# Patient Record
(demographics unavailable — no encounter records)

---

## 2024-10-18 NOTE — DISCUSSION/SUMMARY
[Routine Follow-up in 3-4 months] : routine follow-up in 3-4 months [FreeTextEntry1] :  Continue daily readings. Track and trend Cardiomems numbers. Medications will be adjusted as per [facility] and provider Follow up appointment scheduled.

## 2024-10-18 NOTE — HISTORY OF PRESENT ILLNESS
[None] : The patient complains of no symptoms [de-identified] : CardioMEMs Summary 9/12/24-10/18/24 DOI:10/12/22  AVG PAD 10-13: mmHg Goal 14 mmHg SUMMARY: During the period indicated above, the patient's pulmonary artery pressures were monitored at least weekly and based on the pressures, the pts medications were adjusted. Medication changes were communicated to the patient.   The daily data reports are in the Cardio MEMS flowsheet and are also archived in the [email or web address] PCN system.

## 2024-11-04 NOTE — HISTORY OF PRESENT ILLNESS
[FreeTextEntry1] : ROSALIND HODGE  is a 75 year old  F  Hx CAD, PAF, Afl s/p RFA, CMP/hfp CM, VHD s/p MVR, VT/VF s/p ICD, hypertension, tob use/COPD, upper GI bleed/PUD, CRI, PVD, hyperlipidemia with statin intol.  Previously presented to [facility] with shortness of breath, dizziness, diaphoresis, elevated heart rate >200, hypotension. Cardioverted for WCT: VT versus atrial fibrillation with rapid ventricular rate/BBB. Underwent cardiac cath and subsequent bivICD Then HF and mitral valve replacement with #29 tissue valve 2019  Recurrent AF, afl s/p MARITZA DCCV 3/20, early recurrence RFA of Afl 6/20 ep rx amio  follows with Dr. Mcdaniels d/t CKD intol sglt2i 2/2 uti/ri  underwent right heart cath with CardioMEMS and carotid angiography underwent peripheral revascularization December 2021  she is now on a lower dose of amiodarone off MRA per her nephrologist there was reported hyperkalemia  No chest pain, pressure, palpitations, unusual shortness of breath, orthopnea or syncope. There are no bleeding issues.    PAD range 10-16mmHg. Goal 14.  When last seen there was increased weight and LE edema. Prior to this had been switched from bumex to lasix due to sx of int dizziness. Lasix then switched back to bumex and LE edema has resolved.   Amio maintenance: LFTs 6/10/24 borderline elevated  TFT 5/4/24: TSH 0.5 wnl PFT 5/2023, pt scheduled to see Dr. Lashae guerra 8/2024 Ophtha scheduled 7/2024  Device:  VT/ATP 1/9/24 was asymptomatic, seen by EP since. No change to medications.  Now S/P gen change 6/4/24 remotely transmitting.   July 2024 hg 13.4, k 4.1, cr 1.6 baseline, LDL 52, trig 166,  AST 64/ALT 75  LDL was 249 off rx On Prava 20mg + Leqvio Now LDL 52  EKG VPACED Echo 6/2024 EF 40s mitral valve no evidence of regurg or stenosis,  mild AS, LVH carotid angio  occluded right innominate artery with no significant left-sided disease Cardiac catheterization 2022 moderate diagonal disease, occluded right coronary artery  She is scheduled for a colonoscopy 8/19/24 (never had before) Seen EP 9/25/24    Preoperative cardiovascular examination, colonoscopy At present, there are no active cardiac conditions.  No recent unstable coronary syndromes, decompensated heart failure, severe valvular heart disease or significant dysrhythmias.   Baseline functional status is acceptable.     The clinical benefit of the proposed procedure outweighs the associated cardiovascular risk.   Risk not attenuated with further CV testing.   Prior testing as outlined above. Optimized from a cardiovascular perspective. DVT ppx Even fluid balance with h/o CHF  Continue beta blocker Transition plavix to aspirin 81mg a week prior and continue asa throughout, may transition back postop For surgery and invasive procedures, recommend to discontinue apixaban at least 48 hours prior to elective surgery or invasive procedures with a moderate-to-high risk of clinically significant bleeding. Anticoagulation bridging during the 48 hours apixaban is interrupted and prior to the intervention is not generally required. Reinitiate apixaban when adequate hemostasis has been achieved.  If oral therapy cannot be administered, then consider administration of a parenteral anticoagulant. Note excess discontinuation of any oral anticoagulant, including apixaban, increases the risk of thrombotic events. Patient was counseled and verbalizes understanding of these associated risks.   CMP, HF, h/o pulm edema VHD s/p MVR CRI Last visit there was fluid overload on lasix, switched back to bumex. Pt has mild orthostasis on bumex but is tolerable and LE edema resolved. PAD is stable. Creat at baseline 1.6 Reviewed echo EF stable 40s. MV well seated. Mild AS. Surveillance monitoring.  Monitor CardioMEMS.   Instructed to notify if persistent edema and weight gain  Cont BB Off spironolactone. SGLT2 intolerance  VT, VF s/p ICD. LBBB. PAF. Afl s/p RFA 6/20.  S/P Gen change see device note for details Cont remote monitoring Stopped amiodarone today seeing as LFTs continue to rise - seeing EP 9/25/24 regarding antiarrhythmic rx Remote monitoring interim Monitor LFTs off rx  CAD, occluded RCA. stable EF No angina Cont med rx antiplatelet therapy and anticoagulation  Reviewed bleeding precautions.  PAD Followup planned vascular RM  Hyperlipidemia ( off statin), statin intol  prava 20mg + Leqvio LDL now 52 cont Leqvio + statin  MV SBE prophylaxis Surveillance monitoring   Discussed red flag symptoms, which would warrant sooner or emergent medical evaluation. Any questions and concerns were addressed and resolved.

## 2024-11-06 NOTE — ADDENDUM
[FreeTextEntry1] : Please note the patient was reviewed with NP Patricia West. I was physically present during the service of the patient. I was directly involved in the management plan and recommendations of the care provided to the patient.  I personally reviewed the history and physical examination as documented by the NP above.

## 2024-11-06 NOTE — ASSESSMENT
[FreeTextEntry1] : ROSALIND HODGE is a 75 year old F who presents today Nov 06, 2024 with the above history and the following active issues:   Elevated LFTs Asx discussed refraining from tylenol/etoh remain off amio  temp stop statin and hold off on today's leqvio until further testing US liver has been ordered and pt will call BEKAH Cole to sched followup to review close followup in 6 weeks ideally repeat labs by then if improve retrial with Leqvio   CMP, HF, h/o pulm edema VHD s/p MVR CRI Cont bumex. PAD is stable. Creat baseline 1.6 has improved. Followup nephro re Ca.  Reviewed echo EF stable 40s. MV well seated. Mild AS. Surveillance monitoring.  Monitor CardioMEMS.   Instructed to notify if persistent edema and weight gain  Cont BB Off spironolactone. SGLT2 intolerance  VT, VF s/p ICD. LBBB. PAF. Afl s/p RFA 6/20.  S/P Gen change see device note for details Cont remote monitoring Stopped amiodarone with LFTs as above. EP followup has been arranged regarding AF burden.  Remote monitoring interim  CAD, occluded RCA. stable EF No angina Cont med rx antiplatelet therapy and anticoagulation  Reviewed bleeding precautions.  PAD (carotid/LE) Followup planned vascular intervention RM  Hyperlipidemia ( off statin), statin intol  prava 20mg + Leqvio LDL now 52 adjusting lipid lowering with LFTs will monitor in followup  MV SBE prophylaxis Surveillance monitoring   Discussed red flag symptoms, which would warrant sooner or emergent medical evaluation. Any questions and concerns were addressed and resolved.   Sincerely,  LISSETTE Smith Patients history, testing, and plan reviewed with supervising MD: Dr. Fredrick Jesus

## 2024-11-06 NOTE — HISTORY OF PRESENT ILLNESS
[FreeTextEntry1] : ROSALIND HODGE  is a 75 year old  F  Hx CAD, PAF, Afl s/p RFA, CMP/hfp CM, VHD s/p MVR, VT/VF s/p ICD, hypertension, tob use/COPD, upper GI bleed/PUD, CRI, PVD, hyperlipidemia with statin intol.  Previously presented to [facility] with shortness of breath, dizziness, diaphoresis, elevated heart rate >200, hypotension. Cardioverted for WCT: VT versus atrial fibrillation with rapid ventricular rate/BBB. Underwent cardiac cath and subsequent bivICD Then HF and mitral valve replacement with #29 tissue valve 2019  Recurrent AF, afl s/p MARITZA DCCV 3/20, early recurrence RFA of Afl 6/20 ep rx amio  follows with Dr. Mcdaniels d/t CKD intol sglt2i 2/2 uti/ri  underwent right heart cath with CardioMEMS and carotid angiography underwent peripheral revascularization December 2021  she is now on a lower dose of amiodarone off MRA per her nephrologist there was reported hyperkalemia  No chest pain, pressure, palpitations, unusual shortness of breath, orthopnea or syncope. There are no bleeding issues.    PAD Goal 14. Has overall been below goal.  Intol lasix due to sx of int dizziness. Lasix then switched back to bumex and LE edema has resolved.  She does stationary bike at home.   LFTs 6/10/24 borderline elevated  TFT 5/4/24: TSH 0.5 wnl PFT 5/2023, pt scheduled to see Dr. Lashae guerra 8/2024 Ophtha scheduled 7/2024  Device:  VT/ATP 1/9/24 was asymptomatic, seen by EP since. No change to medications.  Now S/P gen change 6/4/24 remotely transmitting.   July 2024 hg 13.4, k 4.1, cr 1.6 baseline, LDL 52, trig 166,  AST 64/ALT 75  LDL was 249 off rx On Prava 20mg + Leqvio Now LDL 52  EKG VPACED Echo 6/2024 EF 40s mitral valve no evidence of regurg or stenosis,  mild AS, LVH carotid angio  occluded right innominate artery with no significant left-sided disease, followed annually by vascular Cardiac catheterization 2022 moderate diagonal disease, occluded right coronary artery  Since I last saw her in July has seen multiple specialists. Overall has been doing well. Doing home ex bike no exertional symptoms.  She was taken off amio 3 months ago d/t borderline elevated LFTs. Unfortunately #s have increased since then. She has had colonoscopy was told there is a "kink" in her intestines but plan is to monitor d/t asx and CEA was wnl. She sees Dr. Cole.  PAD stable 11mmHg on most recent transmission. BP stable.  Saw EP there is 4% burden of AF, discussed potential for AF ablation if increased burden. She is remotely monitored.  Saw vascular plan is RLE angio/intervention end of Nov.   Lab 10/31/24 , , k 4.6, cr 1.4, elevated Ca level pt reports known by nephro

## 2024-12-19 NOTE — ASSESSMENT
[FreeTextEntry1] : ROSALIND HODGE is a 75 year old F who presents today Dec 18, 2024 with the above history and the following active issues:   Elevated LFTs Asx does not consume tylenol/etoh off amio (7/2024) off statin (11/2024) and last dose leqvio (5/2024) US liver possible hepatic steatosis LFTs on the rise despite cessation of potential hepatotoxic agents Seeing GI Kelsey this week, will fwd test results for further investigation will cont to refrain from hepatotoxic agents until etiology of LFTs is ascertained  DMII last glucose >300 has modified diet, on oral GLP1a discussed preference for insulin in this setting which she declines keep planned followup PCP for management  CMP, HF, h/o pulm edema VHD s/p MVR CRI Cont bumex. PAD is stable. Creat baseline 1.6 has improved. Followup nephro re Ca.  Reviewed echo EF stable 40s. MV well seated. Mild AS. Surveillance monitoring.  Monitor CardioMEMS.   Instructed to notify if persistent edema and weight gain  Cont BB Off spironolactone. SGLT2 intolerance  VT, VF s/p ICD. LBBB. PAF. Afl s/p RFA 6/20.  S/P Gen change see device note for details Cont remote monitoring Off amio, followup EP regarding AF burden.   CAD, occluded RCA. stable EF No angina Cont med rx antiplatelet therapy and anticoagulation  Reviewed bleeding precautions.  PAD (carotid/LE) Followup planned vascular intervention RM, will defer until glucose/LFTs have improved given LE PAD sx are nonlimiting  Hyperlipidemia ( off statin), statin intol  prava 20mg + Leqvio LDL 52 off lipid lowering d/t LFT concerns consult with prevention team  discussed potential for apheresis which is impractical given pt location  MV SBE prophylaxis Surveillance monitoring   Close clinical followup. Coordinate with multiple specialists.  Discussed red flag symptoms, which would warrant sooner or emergent medical evaluation. Any questions and concerns were addressed and resolved.   Sincerely,   LISSETTE Smith Patient's history, testing, medications, and any relative changes to plan of care reviewed with supervising MD: Dr. Fredrick Jesus

## 2024-12-19 NOTE — HISTORY OF PRESENT ILLNESS
[FreeTextEntry1] : ROSALIND HODGE  is a 75 year old  F  Hx CAD, PAF, Afl s/p RFA, CMP/hfp CM, VHD s/p MVR, VT/VF s/p ICD, hypertension, tob use/COPD, upper GI bleed/PUD, CRI, PVD, hyperlipidemia with statin intol.  Previously presented to [facility] with shortness of breath, dizziness, diaphoresis, elevated heart rate >200, hypotension. Cardioverted for WCT: VT versus atrial fibrillation with rapid ventricular rate/BBB. Underwent cardiac cath and subsequent bivICD Then HF and mitral valve replacement with #29 tissue valve 2019  Recurrent AF, afl s/p MARITZA DCCV 3/20, early recurrence RFA of Afl 6/20 ep rx amio  follows with Dr. Mcdaniels d/t CKD intol sglt2i 2/2 uti/ri  underwent right heart cath with CardioMEMS and carotid angiography underwent peripheral revascularization December 2021  she is now on a lower dose of amiodarone off MRA per her nephrologist there was reported hyperkalemia  No chest pain, pressure, palpitations, unusual shortness of breath, orthopnea or syncope. There are no bleeding issues.    PAD Goal 14. Has overall been below goal.  Intol lasix due to sx of int dizziness. Lasix then switched back to bumex and LE edema has resolved.  She does stationary bike at home.   Device:  VT/ATP 1/9/24 was asymptomatic, seen by EP since. No change to medications.  Now S/P gen change 6/4/24 remotely transmitting.   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 Doing home ex bike no exertional symptoms.  She was taken off amio d/t elevated LFTs. Unfortunately #s have increased since then. She has had colonoscopy was told there is a "kink" in her intestines but plan is to monitor d/t asx and CEA was wnl. She sees Dr. Cole.  PAD stable 12mmHg on most recent transmission. BP stable.  Saw EP there is 5% burden of AF, discussed potential for AF ablation if increased burden. She is remotely monitored.  Saw vascular plan is RLE angio/intervention. She reports her LE discomfort is nonlimiting at this time.   Lab 10/31/24 , , k 4.6, cr 1.4, elevated Ca level pt reports known by nephro elevated glucose 346 Last visit a month ago pravastatin was stopped and held off on Leqvio injection d/t LFTs She has also seen PCP who started her on Rybelsus for DMII. She declined any injectable therapy including insulin She reports improving her diet since dx.  Does not consume tylenol/etoh  Lab 12/2024 , ,

## 2024-12-19 NOTE — HISTORY OF PRESENT ILLNESS
[FreeTextEntry1] : ROSALIND HODGE  is a 75 year old  F  Hx CAD, PAF, Afl s/p RFA, CMP/hfp CM, VHD s/p MVR, VT/VF s/p ICD, hypertension, tob use/COPD, upper GI bleed/PUD, CRI, PVD, hyperlipidemia with statin intol.  Previously presented to [facility] with shortness of breath, dizziness, diaphoresis, elevated heart rate >200, hypotension. Cardioverted for WCT: VT versus atrial fibrillation with rapid ventricular rate/BBB. Underwent cardiac cath and subsequent bivICD Then HF and mitral valve replacement with #29 tissue valve 2019  Recurrent AF, afl s/p MARITZA DCCV 3/20, early recurrence RFA of Afl 6/20 ep rx amio  follows with Dr. Mcdaniels d/t CKD intol sglt2i 2/2 uti/ri  underwent right heart cath with CardioMEMS and carotid angiography underwent peripheral revascularization December 2021  she is now on a lower dose of amiodarone off MRA per her nephrologist there was reported hyperkalemia  No chest pain, pressure, palpitations, unusual shortness of breath, orthopnea or syncope. There are no bleeding issues.    PAD Goal 14. Has overall been below goal.  Intol lasix due to sx of int dizziness. Lasix then switched back to bumex and LE edema has resolved.  She does stationary bike at home.   Device:  VT/ATP 1/9/24 was asymptomatic, seen by EP since. No change to medications.  Now S/P gen change 6/4/24 remotely transmitting.   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 Doing home ex bike no exertional symptoms.  She was taken off amio d/t elevated LFTs. Unfortunately #s have increased since then. She has had colonoscopy was told there is a "kink" in her intestines but plan is to monitor d/t asx and CEA was wnl. She sees Dr. Cole.  PAD stable 12mmHg on most recent transmission. BP stable.  Saw EP there is 5% burden of AF, discussed potential for AF ablation if increased burden. She is remotely monitored.  Saw vascular plan is RLE angio/intervention. She reports her LE discomfort is nonlimiting at this time.   Lab 10/31/24 , , k 4.6, cr 1.4, elevated Ca level pt reports known by nephro elevated glucose 346 Last visit a month ago pravastatin was stopped and held off on Leqvio injection d/t LFTs She has also seen PCP who started her on Rybelsus for DMII. She declined any injectable therapy including insulin She reports improving her diet since dx.  Does not consume tylenol/etoh  Lab 12/2024 , ,

## 2024-12-19 NOTE — ADDENDUM
[facility] Neurology Consultation    Janet Regan MRN# [id number]   Age: 68 year old YOB: 1954     Requesting physician: Bradley Rosa     FreeTextEntry1] : Please note the patient was reviewed with NP Patricia West. I was physically present during the service of the patient. I was directly involved in the management plan and recommendations of the care provided to the patient.  I personally reviewed the history and physical examination as documented by the NP above.  

## 2024-12-19 NOTE — REASON FOR VISIT
[Cardiac Failure] : cardiac failure [Coronary Artery Disease] : coronary artery disease [Arrhythmia/ECG Abnorrmalities] : arrhythmia/ECG abnormalities

## 2024-12-25 NOTE — HISTORY OF PRESENT ILLNESS
[de-identified] : CardioMEMs Summary 11/21/24- 12/24/24 DOI:10/12/22  AVG PAD 10-13: mmHg Goal 14 mmHg SUMMARY: During the period indicated above, the patient's pulmonary artery pressures were monitored at least weekly and based on the pressures, the pts medications were adjusted. Medication changes were communicated to the patient.   The daily data reports are in the Cardio MEMS flowsheet and are also archived in the [email or web address] [facility] system.    [None] : The patient complains of no symptoms

## 2024-12-25 NOTE — HISTORY OF PRESENT ILLNESS
[None] : The patient complains of no symptoms [de-identified] : CardioMEMs Summary 11/21/24- 12/24/24 DOI:10/12/22  AVG PAD 10-13: mmHg Goal 14 mmHg SUMMARY: During the period indicated above, the patient's pulmonary artery pressures were monitored at least weekly and based on the pressures, the pts medications were adjusted. Medication changes were communicated to the patient.   The daily data reports are in the Cardio MEMS flowsheet and are also archived in the [email or web address] [facility] system.

## 2025-01-29 NOTE — HISTORY OF PRESENT ILLNESS
[None] : The patient complains of no symptoms [de-identified] : CardioMEMs Summary Reviewed 12/24/24-1/29/25 DOI:10/12/22  AVG PAD 10-13: mmHg Goal 14 mmHg SUMMARY: During the period indicated above, the patient's pulmonary artery pressures were monitored at least weekly and based on the pressures, the pts medications were adjusted. Medication changes were communicated to the patient.   The daily data reports are in the Cardio MEMS flowsheet and are also archived in the [email or web address] [facility] system.

## 2025-02-12 NOTE — HISTORY OF PRESENT ILLNESS
[FreeTextEntry1] : ROSALIND HODGE  is a 75 year old  F  Hx CAD, PAF, Afl s/p RFA, CMP/hfp CM, VHD s/p MVR, VT/VF s/p ICD, hypertension, tob use/COPD, upper GI bleed/PUD, CRI, PVD, hyperlipidemia with statin intol.  Previously presented to [facility] with shortness of breath, dizziness, diaphoresis, elevated heart rate >200, hypotension. Cardioverted for WCT: VT versus atrial fibrillation with rapid ventricular rate/BBB. Underwent cardiac cath and subsequent bivICD Then HF and mitral valve replacement with #29 tissue valve 2019  Recurrent AF, afl s/p MARITZA DCCV 3/20, early recurrence RFA of Afl 6/20 ep rx amio  follows with Dr. Mcdaniels d/t CKD intol sglt2i 2/2 uti/ri  underwent right heart cath with CardioMEMS and carotid angiography underwent peripheral revascularization December 2021  She does stationary bike at home.  No chest pain, pressure, palpitations, unusual shortness of breath, orthopnea or syncope. There are no bleeding issues.    PAD Goal 14.  off MRA per her nephrologist there was reported hyperkalemia Intol lasix due to sx of int dizziness. Lasix then switched back to bumex and LE edema has resolved.  Bumex dose recently lowered by HF team.   Device:  VT/ATP 1/9/24 was asymptomatic, seen by EP since. No change to medications.  Now S/P gen change 6/4/24 remotely transmitting.   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 Doing home ex bike no exertional symptoms.  She was taken off amio d/t elevated LFTs. Unfortunately #s have increased since then. She has had colonoscopy was told there is a "kink" in her intestines but plan is to monitor d/t asx and CEA was wnl. She sees Dr. Cole.  Saw EP there is 5% burden of AF, discussed potential for AF ablation if increased burden. She is remotely monitored.  Saw vascular plan is RLE angio/intervention. She reports her LE discomfort is nonlimiting at this time.  She has lost weight since last seen. Reports her sugars have improved.  Bumex dosing was reduced.  Infrequent transmissions since change but last was 15 on 2/10/25.  She has noticed an increase in LE edema with reduction of diuretic.  Held off on Leqvio injection d/t LFTs, also statin on hold She has also seen PCP who started her on Rybelsus for DMII. She declined any injectable therapy including insulin She reports improving her diet since dx.  Does not consume tylenol/etoh Last visit with GI reviewed discussed possibly trial off plavix d/t concern for drug induced steatohepatitis.  However as per #s reflected below have improved.  Lab 2/11/25 ALT 58, AST 44 2/3/25 k 4.7, cr 1.28, a1c 9.3, Ca 9.7, glucose 152, microal/creat ratio 233mg/g Lab 12/2024 , ,  Lab 10/31/24 , , k 4.6, cr 1.4, elevated Ca level pt reports known by nephro elevated glucose 346

## 2025-02-12 NOTE — HISTORY OF PRESENT ILLNESS
[FreeTextEntry1] : ROSALIND HODGE  is a 75 year old  F  Hx CAD, PAF, Afl s/p RFA, CMP/hfp CM, VHD s/p MVR, VT/VF s/p ICD, hypertension, tob use/COPD, upper GI bleed/PUD, CRI, PVD, hyperlipidemia with statin intol.  Previously presented to [facility] [place] with shortness of breath, dizziness, diaphoresis, elevated heart rate >200, hypotension. Cardioverted for WCT: VT versus atrial fibrillation with rapid ventricular rate/BBB. Underwent cardiac cath and subsequent bivICD Then HF and mitral valve replacement with #29 tissue valve 2019  Recurrent AF, afl s/p MARITZA DCCV 3/20, early recurrence RFA of Afl 6/20 ep rx amio  follows with Dr. Mcdaniels d/t CKD intol sglt2i 2/2 uti/ri  underwent right heart cath with CardioMEMS and carotid angiography underwent peripheral revascularization December 2021  She does stationary bike at home.  No chest pain, pressure, palpitations, unusual shortness of breath, orthopnea or syncope. There are no bleeding issues.    PAD Goal 14.  off MRA per her nephrologist there was reported hyperkalemia Intol lasix due to sx of int dizziness. Lasix then switched back to bumex and LE edema has resolved.  Bumex dose recently lowered by HF team.   Device:  VT/ATP 1/9/24 was asymptomatic, seen by EP since. No change to medications.  Now S/P gen change 6/4/24 remotely transmitting.   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 Doing home ex bike no exertional symptoms.  She was taken off amio d/t elevated LFTs. Unfortunately #s have increased since then. She has had colonoscopy was told there is a "kink" in her intestines but plan is to monitor d/t asx and CEA was wnl. She sees Dr. Cole.  Saw EP there is 5% burden of AF, discussed potential for AF ablation if increased burden. She is remotely monitored.  Saw vascular plan is RLE angio/intervention. She reports her LE discomfort is nonlimiting at this time.  She has lost weight since last seen. Reports her sugars have improved.  Bumex dosing was reduced.  Infrequent transmissions since change but last was 15 on 2/10/25.  She has noticed an increase in LE edema with reduction of diuretic.  Held off on Leqvio injection d/t LFTs, also statin on hold She has also seen PCP who started her on Rybelsus for DMII. She declined any injectable therapy including insulin She reports improving her diet since dx.  Does not consume tylenol/etoh Last visit with GI reviewed discussed possibly trial off plavix d/t concern for drug induced steatohepatitis.  However as per #s reflected below have improved.  Lab 2/11/25 ALT 58, AST 44 2/3/25 k 4.7, cr 1.28, a1c 9.3, Ca 9.7, glucose 152, microal/creat ratio 233mg/g Lab 12/2024 , ,  Lab 10/31/24 , , k 4.6, cr 1.4, elevated Ca level pt reports known by nephro elevated glucose 346

## 2025-02-12 NOTE — REVIEW OF SYSTEMS
[Weight Loss (___ Lbs)] : [unfilled] ~Ulb weight loss [Negative] : Heme/Lymph [Lower Ext Edema] : lower extremity edema

## 2025-02-12 NOTE — ASSESSMENT
[FreeTextEntry1] : ROSALIND HODGE is a 75 year old F who presents today Feb 12, 2025 with the above history and the following active issues:   Elevated LFTs Asx does not consume tylenol/etoh off amio (7/2024) off statin (11/2024) and last dose leqvio (5/2024) US liver possible hepatic steatosis Reviewed GI Kelsey recs this week suspect drug induced steatohepatitis  ALT/AST near normalized on labs drawn yesterday, so would not change antiplatelet/AC at this time There has been improved diet and glucose control Discussed re-trial of inclisiran with patient and remain off amio, statin Pt prefers to trend for 1 more month to ensure LFTs remain stable and check lipids as well.  Will have shared decision making at time of next visit whether to proceed  DMII has modified diet, on oral GLP1a improved a1c keep planned followup PCP for management  CMP, HF, h/o pulm edema VHD s/p MVR CRI PAD low on 2mg daily bumex.  But increased LE edema.  DIscussed alternating 1mg/2mg bumex  Creat 1.2 has improved.  Followup nephro  Reviewed echo EF stable 40s. MV well seated. Mild AS. Surveillance monitoring.  Monitor CardioMEMS.   Instructed to notify if persistent edema and weight gain  Cont BB Off spironolactone (k). SGLT2 intolerance  VT, VF s/p ICD. LBBB. PAF. Afl s/p RFA 6/20.  S/P Gen change see device note for details Cont remote monitoring Off amio, followup EP regarding AF burden.   CAD, occluded RCA. stable EF No angina Cont med rx antiplatelet therapy and anticoagulation  Reviewed bleeding precautions.  PAD (carotid/LE) Followup planned vascular intervention RM, LE PAD sx are nonlimiting, revisit with RM before intervention  Hyperlipidemia ( off statin), statin intol  prava 20mg + Leqvio LDL 52 off lipid lowering d/t LFT concerns apheresis which is impractical given pt location potential re-trial leqvio as discussed above   MV SBE prophylaxis Surveillance monitoring   Close clinical followup. Coordinate with multiple specialists.  Discussed red flag symptoms, which would warrant sooner or emergent medical evaluation. Any questions and concerns were addressed and resolved.   Sincerely,   LISSETTE Smith Patient's history, testing, medications, and any relative changes to plan of care reviewed with supervising MD: Dr. Fredrick Jesus

## 2025-02-12 NOTE — ADDENDUM
[FreeTextEntry1] : Please note the patient was seen and examined with NP Patricia West I was physically present during the service of the patient and personally examined the patient.  I was directly involved in the management plan and recommendations of the care provided to the patient.  I personally reviewed the history and physical examination as documented by the NP above. 02/12/2025

## 2025-02-19 NOTE — HISTORY OF PRESENT ILLNESS
[FreeTextEntry1] : Grecia is a 75-year-old woman who has a dual-chamber biventricular ICD seen in follow-up evaluation.  Overall she has been doing well and does not complain of chest pain, shortness of breath, dizziness, lightheadedness, syncope, presyncope or palpitations.  She has been in A-fib since February 15, 2025 but is not aware of it.  She was taken off amiodarone because her LFTs were elevated.  She is currently off amiodarone  She is status post CRT-D in 2016. She has a prior history of chronic systolic heart failure, CAD with prior small RCA with a  and diagonal lesion 60%.  She is status post bioprosthetic MVR with Dr. Pina 8/2019.  She has had prior s/p CardioMEMs implantation and following with HF team.  Patient has a prior history of hypertension, carotid disease, former smoker and peripheral vascular disease.  She is also had carotid disease, CKD 2 and pulmonary embolus 2011.  1/23/2023.  Echocardiogram.  EF: 35 to 40%.  Prior echo from 2/22 with EF of 45%.   Follow-up echocardiogram 6/28/2023: CONCLUSIONS: 1. The left ventricular systolic function is moderately decreased with an ejection fraction of 40 % by Ring's method of disks with an ejection fraction visually estimated at 42 %. Mid and basal inferior akinesis 2. Mild left ventricular hypertrophy. 3. Compared to prior done on 1/23/2023, no significant changes in EF. ________________________________________________________________________________________ Prior Hx:   She underwent revascularization with Dr. Berry.  12/21.  Right SFA .  Residual distal left CFA stenosis not intervened on.  Reports continued claudication symptoms.  She  underwent atrial flutter ablation on 6/23/2020.  Patient has a history of severe mitral regurgitation and underwent a mitral valve replacement at [facility] in August of 2019.  She also has a prior history of hypertension, CAD, CHF, VT VF and status post biventricular ICD implant.  Echocardiogram April 2021 showed EF 30%.  Prior prosthetic mitral valve replacement.  No regurgitation seen.  No pericardial effusion.  Normal pulmonary pressure.  Previous echocardiogram from November 2020 also showed EF 30%.

## 2025-02-19 NOTE — DISCUSSION/SUMMARY
[FreeTextEntry1] : Overall the patient is doing extremely well and does not have symptoms as a relates to her atrial fibrillation she is not aware of it as well.  The device interrogation did show that she had persistent A-fib since 2/15/2025.  The rates are controlled.  He was previously on amiodarone but this was discontinued because of elevation in LFT.  Follow-up liver function test February 11, 2025 and shows that AST was 44 and ALT 58.  Her biventricular [employer] device was interrogated and otherwise function normally.  Remaining battery longevity was approximately 7.5 years.  The main issue is how to handle her atrial fibrillation this time which has become persistent.  Her last echocardiogram performed June 13, 2024 had showed left atrial size volume index 28.24 cc/m and the EF was approximately 40 to 45%.  The patient had a prior flutter ablation done 6/23/2020.  She mostly has A-fib now and I would recommend A-fib ablation.  I have discussed this option with the patient she will consider it and she wants to discuss with her cardiologist Dr. Jesus before she proceeds.  Reason for recommended ablation as potential decline in left ventricular function with A-fib in patient with previous heart failure.

## 2025-02-19 NOTE — REVIEW OF SYSTEMS
[Lower Ext Edema] : lower extremity edema [Feeling Fatigued] : not feeling fatigued [Dyspnea on exertion] : not dyspnea during exertion [Chest Discomfort] : no chest discomfort [Palpitations] : no palpitations [Orthopnea] : no orthopnea [PND] : no PND [Syncope] : no syncope [Cough] : no cough [Abdominal Pain] : no abdominal pain [Dizziness] : no dizziness [Easy Bleeding] : no tendency for easy bleeding [FreeTextEntry5] : leg heaviness

## 2025-02-19 NOTE — PHYSICAL EXAM
[General Appearance - Well Developed] : well developed [General Appearance - In No Acute Distress] : no acute distress [Normal Conjunctiva] : the conjunctiva exhibited no abnormalities [Respiration, Rhythm And Depth] : normal respiratory rhythm and effort [Exaggerated Use Of Accessory Muscles For Inspiration] : no accessory muscle use [Auscultation Breath Sounds / Voice Sounds] : lungs were clear to auscultation bilaterally [Heart Sounds] : normal S1 and S2 [Arterial Pulses Normal] : the arterial pulses were normal [Nail Clubbing] : no clubbing of the fingernails [Cyanosis, Localized] : no localized cyanosis [] : no rash [No Venous Stasis] : no venous stasis [Impaired Insight] : insight and judgment were intact [Affect] : the affect was normal [FreeTextEntry1] : Slow gait

## 2025-03-04 NOTE — HISTORY OF PRESENT ILLNESS
[None] : The patient complains of no symptoms [de-identified] : CardioMEMs Summary  Reviewed 1/29/25-3/4/25 DOI:10/12/22  AVG PAD 11-17: mmHg Goal 14 mmHg SUMMARY: During the period indicated above, the patient's pulmonary artery pressures were monitored at least weekly and based on the pressures, the pts medications were adjusted. Medication changes were communicated to the patient.   The daily data reports are in the Cardio MEMS flowsheet and are also archived in the [email or web address] [facility]N system.

## 2025-03-04 NOTE — HISTORY OF PRESENT ILLNESS
[None] : The patient complains of no symptoms [de-identified] : CardioMEMs Summary  Reviewed 1/29/25-3/4/25 DOI:10/12/22  AVG PAD 11-17: mmHg Goal 14 mmHg SUMMARY: During the period indicated above, the patient's pulmonary artery pressures were monitored at least weekly and based on the pressures, the pts medications were adjusted. Medication changes were communicated to the patient.   The daily data reports are in the Cardio MEMS flowsheet and are also archived in the [email or web address] [facility]N system.

## 2025-03-13 NOTE — ASSESSMENT
[FreeTextEntry1] : ROSALIND HODGE is a 75 year old F who presents today Mar 12, 2025 with the above history and the following active issues:   LANE creat had improved prior 1.3, now 3 likely r/t dehydration 2/2 physical limitations from gout attack holding ARNI and diuretic at this time, note low BP and PAD discussed need for addl CMEMS readings this week to help guide GDMT lab draw fri am to decide on re-addition of rx before weekend (likely add arni first) increase oral hydration fwd labs to nephro emphasized need for gout txt, suggest oral therapy, she will see her podiatrist tomorrow to discuss, asked to call her PCP if oral therapy is not discussed close clinical followup   Elevated LFTs does not consume tylenol/etoh off amio (7/2024) off statin (11/2024) and last dose leqvio (5/2024) US liver possible hepatic steatosis Reviewed GI Kelsey recs this week 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 She understands risks and continues to decline re-trial of Leqvio today given pain from gout, will revisit this again next month once she's recovered.   DMII has modified diet, on oral GLP1a improved a1c keep planned followup PCP for management  CMP, HF, h/o pulm edema VHD s/p MVR CRI nephro  Reviewed echo EF stable 40s. MV well seated. Mild AS. Surveillance monitoring.  Monitor CardioMEMS.   Instructed to notify if persistent edema and weight gain  Cont BB Off spironolactone (k). SGLT2 intolerance see above  VT, VF s/p ICD. LBBB. PAF. Afl s/p RFA 6/20.  Off amio Now back in AF/AFL, seen by EP discussed ablation She is asx for AF and rate controlled Followup echo 1 month to assess LVEF and DVC to monitor AF burden Seems to coincide with pain/gout attack Pt prefers to avoid invasive procedures at this time Will discuss further in followup   CAD, occluded RCA. stable EF No angina Cont med rx antiplatelet therapy and anticoagulation   Reviewed bleeding precautions.  PAD (carotid/LE) LE PAD sx are nonlimiting, cont to hold of on LE intervention pedrito now given LANE  Hyperlipidemia ( off statin), statin intol prava 20mg + Leqvio LDL 52 off lipid lowering d/t LFT concerns apheresis which is impractical given pt location potential re-trial leqvio as discussed above   MV SBE prophylaxis Surveillance monitoring   Close clinical followup. Coordinate with multiple specialists.  Discussed red flag symptoms, which would warrant sooner or emergent medical evaluation. Any questions and concerns were addressed and resolved.   Sincerely,   LISSETTE Smith Patient's history, testing, medications, and any relative changes to plan of care reviewed with supervising MD: Dr. Fredrick Jesus

## 2025-03-13 NOTE — HISTORY OF PRESENT ILLNESS
[FreeTextEntry1] : ROSALIND HODGE  is a 75 year old  F  Hx CAD, PAF, Afl s/p RFA, CMP/hfp CM, VHD s/p MVR, VT/VF s/p ICD, hypertension, tob use/COPD, upper GI bleed/PUD, CRI, PVD, hyperlipidemia with statin intol.  Previously presented to [facility] with shortness of breath, dizziness, diaphoresis, elevated heart rate >200, hypotension. Cardioverted for WCT: VT versus atrial fibrillation with rapid ventricular rate/BBB. Underwent cardiac cath and subsequent bivICD Then HF and mitral valve replacement with #29 tissue valve 2019  Recurrent AF, afl s/p MARITZA DCCV 3/20, early recurrence RFA of Afl 6/20 ep rx amio  follows with Dr. Mcadniels d/t CKD intol sglt2i 2/2 uti/ri  underwent right heart cath with CardioMEMS and carotid angiography underwent peripheral revascularization December 2021  She does stationary bike at home.  No chest pain, pressure, palpitations, unusual shortness of breath, orthopnea or syncope. There are no bleeding issues.    PAD Goal 14.  off MRA per her nephrologist there was reported hyperkalemia Intol lasix due to sx of int dizziness. Lasix then switched back to bumex and LE edema has resolved.   Device:  VT/ATP 1/9/24 was asymptomatic, seen by EP since. No change to medications.  Now S/P gen change 6/4/24 remotely transmitting.   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 Doing home ex bike no exertional symptoms.  She was taken off amio d/t elevated LFTs. Unfortunately #s have increased since then. She has had colonoscopy was told there is a "kink" in her intestines but plan is to monitor d/t asx and CEA was wnl. She sees Dr. Cole.  Saw vascular plan is RLE angio/intervention. She reports her LE discomfort is nonlimiting at this time.  She has lost weight since last seen. Reports her sugars have improved.  Infrequent CMEMS transmissions .  Held off on Leqvio injection d/t LFTs, also statin on hold She has also seen PCP who started her on Rybelsus for DMII. She declined any injectable therapy including insulin She reports improving her diet since dx.  Does not consume tylenol/etoh Last visit with GI reviewed discussed possibly trial off plavix d/t concern for drug induced steatohepatitis.  However as per #s reflected below have improved. She noticed an increase in LE edema with reduction of diuretic, she was placed on bumex alt 1mg/2mg dosing.   PAD 3/10/25 was 7mmHg (goal is 14) She is reporting severe L gr toe gout exacerbation for a month. She is unable to walk independently without walker. She saw podiatrist only minor improvement with steroid injection, not on oral gout therapy. She has another mayte tomorrow.  She was asked to hold her entresto and bumex the past two days given LANE on labs. Her nephro is Dr Mcdaniels.  There is no edema on my exam. There is localized inflammation r/t gout.   EKG today is Vpaced with underlying AFL. She saw EP as appears she went back into persistent AF on 2/15 with controlled rates (coincides with gout attack), and there is discussion of AF ablation. She is asx for AF.  BP is low today.  She admits given current physical limitations has not been drinking enough water. She has not felt GI side effects on lower dose of Rybelsus 7mg.   Lab 3/7/25 creat 3 !, K 3.1, AST 46, ALT 40, trigs 235,  Lab 2/11/25 ALT 58, AST 44 2/3/25 k 4.7, cr 1.28, a1c 9.3, Ca 9.7, glucose 152, microal/creat ratio 233mg/g Lab 12/2024 , ,  Lab 10/31/24 , , k 4.6, cr 1.4, elevated Ca level pt reports known by nephro elevated glucose 346

## 2025-03-13 NOTE — HISTORY OF PRESENT ILLNESS
[FreeTextEntry1] : ROSALIND HODGE  is a 75 year old  F  Hx CAD, PAF, Afl s/p RFA, CMP/hfp CM, VHD s/p MVR, VT/VF s/p ICD, hypertension, tob use/COPD, upper GI bleed/PUD, CRI, PVD, hyperlipidemia with statin intol.  Previously presented to [facility] with shortness of breath, dizziness, diaphoresis, elevated heart rate >200, hypotension. Cardioverted for WCT: VT versus atrial fibrillation with rapid ventricular rate/BBB. Underwent cardiac cath and subsequent bivICD Then HF and mitral valve replacement with #29 tissue valve 2019  Recurrent AF, afl s/p MARITZA DCCV 3/20, early recurrence RFA of Afl 6/20 ep rx amio  follows with Dr. Mcdaniels d/t CKD intol sglt2i 2/2 uti/ri  underwent right heart cath with CardioMEMS and carotid angiography underwent peripheral revascularization December 2021  She does stationary bike at home.  No chest pain, pressure, palpitations, unusual shortness of breath, orthopnea or syncope. There are no bleeding issues.    PAD Goal 14.  off MRA per her nephrologist there was reported hyperkalemia Intol lasix due to sx of int dizziness. Lasix then switched back to bumex and LE edema has resolved.   Device:  VT/ATP 1/9/24 was asymptomatic, seen by EP since. No change to medications.  Now S/P gen change 6/4/24 remotely transmitting.   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 Doing home ex bike no exertional symptoms.  She was taken off amio d/t elevated LFTs. Unfortunately #s have increased since then. She has had colonoscopy was told there is a "kink" in her intestines but plan is to monitor d/t asx and CEA was wnl. She sees Dr. Cole.  Saw vascular plan is RLE angio/intervention. She reports her LE discomfort is nonlimiting at this time.  She has lost weight since last seen. Reports her sugars have improved.  Infrequent CMEMS transmissions .  Held off on Leqvio injection d/t LFTs, also statin on hold She has also seen PCP who started her on Rybelsus for DMII. She declined any injectable therapy including insulin She reports improving her diet since dx.  Does not consume tylenol/etoh Last visit with GI reviewed discussed possibly trial off plavix d/t concern for drug induced steatohepatitis.  However as per #s reflected below have improved. She noticed an increase in LE edema with reduction of diuretic, she was placed on bumex alt 1mg/2mg dosing.   PAD 3/10/25 was 7mmHg (goal is 14) She is reporting severe L gr toe gout exacerbation for a month. She is unable to walk independently without walker. She saw podiatrist only minor improvement with steroid injection, not on oral gout therapy. She has another mayte tomorrow.  She was asked to hold her entresto and bumex the past two days given LANE on labs. Her nephro is Dr Mcdaniels.  There is no edema on my exam. There is localized inflammation r/t gout.   EKG today is Vpaced with underlying AFL. She saw EP as appears she went back into persistent AF on 2/15 with controlled rates (coincides with gout attack), and there is discussion of AF ablation. She is asx for AF.  BP is low today.  She admits given current physical limitations has not been drinking enough water. She has not felt GI side effects on lower dose of Rybelsus 7mg.   Lab 3/7/25 creat 3 !, K 3.1, AST 46, ALT 40, trigs 235,  Lab 2/11/25 ALT 58, AST 44 2/3/25 k 4.7, cr 1.28, a1c 9.3, Ca 9.7, glucose 152, microal/creat ratio 233mg/g Lab 12/2024 , ,  Lab 10/31/24 , , k 4.6, cr 1.4, elevated Ca level pt reports known by nephro elevated glucose 346

## 2025-03-13 NOTE — REVIEW OF SYSTEMS
[Weight Loss (___ Lbs)] : [unfilled] ~Ulb weight loss [Lower Ext Edema] : lower extremity edema [Joint Swelling] : joint swelling [Negative] : Cardiovascular

## 2025-04-16 NOTE — ADDENDUM
The patient is a 68y Male complaining of rectal bleeding. [FreeTextEntry1] : Please note the patient was reviewed with advanced practice provider I was physically present during the service of the patient I was directly involved in the management plan and recommendations of care provided to the patient.  04/16/2025

## 2025-04-16 NOTE — PROCEDURE
[No] : not [See Device Printout] : See device printout [CRT-D] : Cardiac resynchronization therapy defibrillator [DDD] : DDD [Threshold Testing Performed] : Threshold testing was performed [Lead Imp:  ___ohms] : lead impedance was [unfilled] ohms [Sensing Amplitude ___mv] : sensing amplitude was [unfilled] mv [___V @] : [unfilled] V [___ ms] : [unfilled] ms [None] : none [Counters Reset] : the counters were reset [Apace-Vpace ___ %] : Apace-Vpace [unfilled]% [de-identified] : [facility] [de-identified] : G447 RESONATE X4 CRT-D [de-identified] : [id number] [de-identified] : 6/4/24 [de-identified] :  [de-identified] : 7.5 years  [de-identified] :  PAF 56% rates controlled 70s overall asymptomatic with stable EF 40-45% on eliquis 5mg BID and toprol 100mg BID followup EP re rhythm control see office note for full details  cont remote monitoring

## 2025-04-16 NOTE — PROCEDURE
[No] : not [See Device Printout] : See device printout [CRT-D] : Cardiac resynchronization therapy defibrillator [DDD] : DDD [Threshold Testing Performed] : Threshold testing was performed [Lead Imp:  ___ohms] : lead impedance was [unfilled] ohms [Sensing Amplitude ___mv] : sensing amplitude was [unfilled] mv [___V @] : [unfilled] V [___ ms] : [unfilled] ms [None] : none [Counters Reset] : the counters were reset [Apace-Vpace ___ %] : Apace-Vpace [unfilled]% [de-identified] : [facility] [de-identified] : G447 RESONATE X4 CRT-D [de-identified] : [id number] [de-identified] : 6/4/24 [de-identified] :  [de-identified] : 7.5 years  [de-identified] :  PAF 56% rates controlled 70s overall asymptomatic with stable EF 40-45% on eliquis 5mg BID and toprol 100mg BID followup EP re rhythm control see office note for full details  cont remote monitoring

## 2025-04-16 NOTE — ADDENDUM
[FreeTextEntry1] : Please note the patient was reviewed with advanced practice provider I was physically present during the service of the patient I was directly involved in the management plan and recommendations of care provided to the patient.  04/16/2025

## 2025-04-16 NOTE — ASSESSMENT
[FreeTextEntry1] : ROSALIND HODGE is a 75 year old F who presents today Apr 16, 2025 with the above history and the following active issues:   LANE creat had improved prior 1.3, now 3 likely r/t dehydration 2/2 physical limitations from gout attack holding ARNI and diuretic at this time, note low BP and PAD discussed need for addl CMEMS readings this week to help guide GDMT lab draw fri am to decide on re-addition of rx before weekend (likely add arni first) increase oral hydration fwd labs to nephro emphasized need for gout txt, suggest oral therapy, she will see her podiatrist tomorrow to discuss, asked to call her PCP if oral therapy is not discussed close clinical followup   Elevated LFTs does not consume tylenol/etoh off amio (7/2024) off statin (11/2024) and last dose leqvio (5/2024) US liver possible hepatic steatosis Reviewed GI Kelsey recs this week 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 She understands risks and continues to decline re-trial of Leqvio today given pain from gout, will revisit this again next month once she's recovered.   DMII has modified diet, on oral GLP1a improved a1c keep planned followup PCP for management  CMP, HF, h/o pulm edema VHD s/p MVR CRI nephro  Reviewed echo EF stable 40s. MV well seated. Mild AS. Surveillance monitoring.  Monitor CardioMEMS.   Instructed to notify if persistent edema and weight gain  Cont BB Off spironolactone (k). SGLT2 intolerance see above  VT, VF s/p ICD. LBBB. PAF. Afl s/p RFA 6/20.  Off amio Now back in AF/AFL, seen by EP discussed ablation She is asx for AF and rate controlled Followup echo 1 month to assess LVEF and DVC to monitor AF burden Seems to coincide with pain/gout attack Pt prefers to avoid invasive procedures at this time Will discuss further in followup   CAD, occluded RCA. stable EF No angina Cont med rx antiplatelet therapy and anticoagulation   Reviewed bleeding precautions.  PAD (carotid/LE) LE PAD sx are nonlimiting, cont to hold of on LE intervention pedrito now given LANE  Hyperlipidemia ( off statin), statin intol prava 20mg + Leqvio LDL 52 off lipid lowering d/t LFT concerns apheresis which is impractical given pt location potential re-trial leqvio as discussed above   MV SBE prophylaxis Surveillance monitoring   Close clinical followup. Coordinate with multiple specialists.  Discussed red flag symptoms, which would warrant sooner or emergent medical evaluation. Any questions and concerns were addressed and resolved.   Sincerely,   LISSETTE Smith Patient's history, testing, medications, and any relative changes to plan of care reviewed with supervising MD: Dr. Fredrick Jesus

## 2025-04-16 NOTE — HISTORY OF PRESENT ILLNESS
[FreeTextEntry1] : ROSALIND HODGE  is a 75 year old  F  Hx CAD, PAF, Afl s/p RFA, CMP/hfp CM, VHD s/p MVR, VT/VF s/p ICD, hypertension, tob use/COPD, upper GI bleed/PUD, CRI, PVD, hyperlipidemia with statin intol.  Previously presented to [facility] with shortness of breath, dizziness, diaphoresis, elevated heart rate >200, hypotension. Cardioverted for WCT: VT versus atrial fibrillation with rapid ventricular rate/BBB. Underwent cardiac cath and subsequent bivICD Then HF and mitral valve replacement with #29 tissue valve 2019  Recurrent AF, afl s/p MARITZA DCCV 3/20, early recurrence RFA of Afl 6/20 ep rx amio  follows with Dr. Mcdaniels d/t CKD intol sglt2i 2/2 uti/ri  underwent right heart cath with CardioMEMS and carotid angiography underwent peripheral revascularization December 2021  She does stationary bike at home.  No chest pain, pressure, palpitations, unusual shortness of breath, orthopnea or syncope. There are no bleeding issues.    PAD Goal 14.  off MRA per her nephrologist there was reported hyperkalemia Intol lasix due to sx of int dizziness. Lasix then switched back to bumex and LE edema has resolved.   Device:  VT/ATP 1/9/24 was asymptomatic, seen by EP since. No change to medications.  Now S/P gen change 6/4/24 remotely transmitting.   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 Doing home ex bike no exertional symptoms.  She was taken off amio d/t elevated LFTs. Unfortunately #s have increased since then. She has had colonoscopy was told there is a "kink" in her intestines but plan is to monitor d/t asx and CEA was wnl. She sees Dr. Cole.  Saw vascular plan is RLE angio/intervention. She reports her LE discomfort is nonlimiting at this time.  She has lost weight since last seen. Reports her sugars have improved.  Infrequent CMEMS transmissions .  Held off on Leqvio injection d/t LFTs, also statin on hold She has also seen PCP who started her on Rybelsus for DMII. She declined any injectable therapy including insulin She reports improving her diet since dx.  Does not consume tylenol/etoh Last visit with GI reviewed discussed possibly trial off plavix d/t concern for drug induced steatohepatitis.  However as per #s reflected below have improved. She noticed an increase in LE edema with reduction of diuretic, she was placed on bumex alt 1mg/2mg dosing.   PAD 3/10/25 was 7mmHg (goal is 14) She is reporting severe L gr toe gout exacerbation for a month. She is unable to walk independently without walker. She saw podiatrist only minor improvement with steroid injection, not on oral gout therapy. She has another mayte tomorrow.  She was asked to hold her entresto and bumex the past two days given LANE on labs. Her nephro is Dr Mcdaniels.  There is no edema on my exam. There is localized inflammation r/t gout.   EKG today is Vpaced with underlying AFL. She saw EP as appears she went back into persistent AF on 2/15 with controlled rates (coincides with gout attack), and there is discussion of AF ablation. She is asx for AF.  BP is low today.  She admits given current physical limitations has not been drinking enough water. She has not felt GI side effects on lower dose of Rybelsus 7mg.   Lab 3/7/25 creat 3 !, K 3.1, AST 46, ALT 40, trigs 235,  Lab 2/11/25 ALT 58, AST 44 2/3/25 k 4.7, cr 1.28, a1c 9.3, Ca 9.7, glucose 152, microal/creat ratio 233mg/g Lab 12/2024 , ,  Lab 10/31/24 , , k 4.6, cr 1.4, elevated Ca level pt reports known by nephro elevated glucose 346

## 2025-04-16 NOTE — HISTORY OF PRESENT ILLNESS
[FreeTextEntry1] : ROSALIND HODGE  is a 75 year old  F  Hx CAD, PAF, Afl s/p RFA, CMP/hfp CM, VHD s/p MVR, VT/VF s/p ICD, hypertension, tob use/COPD, upper GI bleed/PUD, CRI, PVD, hyperlipidemia with statin intol.  Previously presented to [facility] with shortness of breath, dizziness, diaphoresis, elevated heart rate >200, hypotension. Cardioverted for WCT: VT versus atrial fibrillation with rapid ventricular rate/BBB. Underwent cardiac cath and subsequent bivICD Then HF and mitral valve replacement with #29 tissue valve 2019  Recurrent AF, afl s/p MARITZA DCCV 3/20, early recurrence RFA of Afl 6/20 ep rx amio  follows with Dr. Mcdaniesl d/t CKD intol sglt2i 2/2 uti/ri  underwent right heart cath with CardioMEMS and carotid angiography underwent peripheral revascularization December 2021  She does stationary bike at home.  No chest pain, pressure, palpitations, unusual shortness of breath, orthopnea or syncope. There are no bleeding issues.    PAD Goal 14.  off MRA per her nephrologist there was reported hyperkalemia Intol lasix due to sx of int dizziness. Lasix then switched back to bumex and LE edema has resolved.   Device:  VT/ATP 1/9/24 was asymptomatic, seen by EP since. No change to medications.  Now S/P gen change 6/4/24 remotely transmitting.   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 Doing home ex bike no exertional symptoms.  She was taken off amio d/t elevated LFTs. Unfortunately #s have increased since then. She has had colonoscopy was told there is a "kink" in her intestines but plan is to monitor d/t asx and CEA was wnl. She sees Dr. Cole.  Saw vascular plan is RLE angio/intervention. She reports her LE discomfort is nonlimiting at this time.  She has lost weight since last seen. Reports her sugars have improved.  Infrequent CMEMS transmissions .  Held off on Leqvio injection d/t LFTs, also statin on hold She has also seen PCP who started her on Rybelsus for DMII. She declined any injectable therapy including insulin She reports improving her diet since dx.  Does not consume tylenol/etoh Last visit with GI reviewed discussed possibly trial off plavix d/t concern for drug induced steatohepatitis.  However as per #s reflected below have improved. She noticed an increase in LE edema with reduction of diuretic, she was placed on bumex alt 1mg/2mg dosing.   PAD 3/10/25 was 7mmHg (goal is 14) She is reporting severe L gr toe gout exacerbation for a month. She is unable to walk independently without walker. She saw podiatrist only minor improvement with steroid injection, not on oral gout therapy. She has another mayte tomorrow.  She was asked to hold her entresto and bumex the past two days given LANE on labs. Her nephro is Dr Mcdaniels.  There is no edema on my exam. There is localized inflammation r/t gout.   EKG today is Vpaced with underlying AFL. She saw EP as appears she went back into persistent AF on 2/15 with controlled rates (coincides with gout attack), and there is discussion of AF ablation. She is asx for AF.  BP is low today.  She admits given current physical limitations has not been drinking enough water. She has not felt GI side effects on lower dose of Rybelsus 7mg.   Lab 3/7/25 creat 3 !, K 3.1, AST 46, ALT 40, trigs 235,  Lab 2/11/25 ALT 58, AST 44 2/3/25 k 4.7, cr 1.28, a1c 9.3, Ca 9.7, glucose 152, microal/creat ratio 233mg/g Lab 12/2024 , ,  Lab 10/31/24 , , k 4.6, cr 1.4, elevated Ca level pt reports known by nephro elevated glucose 346

## 2025-04-16 NOTE — REVIEW OF SYSTEMS
[Weight Loss (___ Lbs)] : [unfilled] ~Ulb weight loss [Joint Swelling] : joint swelling [Negative] : Heme/Lymph

## 2025-05-10 NOTE — HISTORY OF PRESENT ILLNESS
[None] : The patient complains of no symptoms [de-identified] : CardioMEMs Summary  Reviewed 4/8/25-5/9/25 DOI:10/12/22  AVG PAD 10-15: mmHg Goal 14 mmHg SUMMARY: During the period indicated above, the patient's pulmonary artery pressures were monitored at least weekly and based on the pressures, the pts medications were adjusted. Medication changes were communicated to the patient.   The daily data reports are in the Cardio MEMS flowsheet and are also archived in the [email or web address] [facility] system.

## 2025-05-10 NOTE — HISTORY OF PRESENT ILLNESS
[None] : The patient complains of no symptoms [de-identified] : CardioMEMs Summary  Reviewed 4/8/25-5/9/25 DOI:10/12/22  AVG PAD 10-15: mmHg Goal 14 mmHg SUMMARY: During the period indicated above, the patient's pulmonary artery pressures were monitored at least weekly and based on the pressures, the pts medications were adjusted. Medication changes were communicated to the patient.   The daily data reports are in the Cardio MEMS flowsheet and are also archived in the [email or web address] [facility] system.

## 2025-05-10 NOTE — HISTORY OF PRESENT ILLNESS
[None] : The patient complains of no symptoms [de-identified] : CardioMEMs Summary  Reviewed 4/8/25-5/9/25 DOI:10/12/22  AVG PAD 10-15: mmHg Goal 14 mmHg SUMMARY: During the period indicated above, the patient's pulmonary artery pressures were monitored at least weekly and based on the pressures, the pts medications were adjusted. Medication changes were communicated to the patient.   The daily data reports are in the Cardio MEMS flowsheet and are also archived in the [email or web address] [facility] system.

## 2025-06-10 NOTE — REVIEW OF SYSTEMS
[Weight Loss (___ Lbs)] : [unfilled] ~Ulb weight loss [Joint Swelling] : joint swelling [Negative] : Musculoskeletal

## 2025-06-10 NOTE — ASSESSMENT
[FreeTextEntry1] : ROSALIND HODGE is a 76 year old F who presents today Jun 03, 2025 with the above history and the following active issues:  CKD recent LANE likely r/t dehydration 2/2 physical limitations from gout attack significant improved creat and BNP on recent labs  Cont ARNI and bumex as directed by HF team, followup CMEMS readings to help guide GDMT monitor BMP/BNP increase oral hydration followup nephro Enedelia followup podiatrist  followup PCP re preventive gout therapy, I strongly advised this given complications from recent attack  Elevated LFTs, resolved does not consume tylenol/etoh off amio (7/2024) off statin (11/2024)  US liver possible hepatic steatosis Reviewed GI Kelsey recs this week suspect drug induced steatohepatitis  ALT/AST near normalized on labs x2, so would not change antiplatelet/AC at this time There has been improved diet and glucose control remain off amio, statin at this time  Restarted Leqvio 4/2025, next 7/2025, LFTs stable, will monitor  may reconsider low dose statin if LDL above goal after followup injetion (<55)  DMII has modified diet, on oral GLP1a contrast on wt loss improved a1c keep planned followup PCP for management  CMP, HF, h/o pulm edema VHD s/p MVR CRI nephro  Reviewed echo EF stable 40s. MV well seated. Mild AS. Surveillance monitoring.  Monitor CardioMEMS.   Instructed to notify if persistent edema and weight gain  Cont BB Off spironolactone (k). SGLT2 intolerance see above  VT, VF s/p ICD. LBBB. PAF. Afl s/p RFA 6/20.  Off amio PAF/AFL, now 56% burden with controlled rates, seen by EP discussed ablation planned 7/24/25  LVEF remains unchanged from prior 40s Pt hesitant to pursue invasive procedures Discussed risk of escalating AF and potential for associated HF exacerbation.  Remote monitoring   CAD, occluded RCA. stable EF No angina Cont med rx antiplatelet therapy and anticoagulation   Reviewed bleeding precautions.  PAD (carotid/LE) LE PAD sx are nonlimiting, LE intervention has been deferred   Hyperlipidemia ( off statin), statin intol prava 20mg + Leqvio LDL 52 apheresis which is impractical given pt location re-trial leqvio as discussed above   MV SBE prophylaxis Surveillance monitoring   Close clinical followup. Coordinate with multiple specialists.  Discussed red flag symptoms, which would warrant sooner or emergent medical evaluation. Any questions and concerns were addressed and resolved.   Sincerely,   LISSETTE Smith Patient's history, testing, medications, and any relative changes to plan of care reviewed with supervising MD: Dr. Fredrick Jesus

## 2025-06-10 NOTE — HISTORY OF PRESENT ILLNESS
[FreeTextEntry1] : ROSALIND HODGE  is a 76 year old  F  Hx CAD, PAF, Afl s/p RFA, CMP/hfp CM, VHD s/p MVR, VT/VF s/p ICD, hypertension, tob use/COPD, upper GI bleed/PUD, CRI, PVD, hyperlipidemia with statin intol.  Previously presented to [facility] with shortness of breath, dizziness, diaphoresis, elevated heart rate >200, hypotension. Cardioverted for WCT: VT versus atrial fibrillation with rapid ventricular rate/BBB. Underwent cardiac cath and subsequent bivICD Then HF and mitral valve replacement with #29 tissue valve 2019  Recurrent AF, afl s/p MARITZA DCCV 3/20, early recurrence RFA of Afl 6/20 ep rx amio  follows with Dr. Mcdaniels d/t CKD intol sglt2i 2/2 uti/ri  underwent right heart cath with CardioMEMS and carotid angiography underwent peripheral revascularization December 2021  She does stationary bike at home.  No chest pain, pressure, palpitations, unusual shortness of breath, orthopnea or syncope. There are no bleeding issues.    PAD Goal 14.  off MRA per her nephrologist there was reported hyperkalemia Intol lasix due to sx of int dizziness. Lasix then switched back to bumex which she responds better to.   Device:  VT/ATP 1/9/24 was asymptomatic, seen by EP since. No change to medications.  Now S/P gen change 6/4/24 remotely transmitting.   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 Doing home ex bike no exertional symptoms.  She was taken off amio d/t elevated LFTs. Then #s increased. She has had colonoscopy was told there is a "kink" in her intestines but plan is to monitor d/t asx and CEA was wnl. She sees Dr. Cole.  Saw vascular discussed of RLE angio/intervention. She reports her LE discomfort is nonlimiting at this time. This was deferred.  Statin + Leqvio were on hold for a period She has also seen PCP who started her on Rybelsus for DMII. She declined any injectable therapy including insulin She reports improving her diet since dx. She has lost weight since last seen. Reports her sugars have improved.  Does not consume tylenol/etoh Last visit with GI reviewed discussed possibly trial off plavix d/t concern for drug induced steatohepatitis.  However as per #s reflected below have improved. She restarted Leqvio 4/16/25. Next 7/2025.   PAD 3/10/25 was 7mmHg (goal is 14) There was severe L gr toe gout exacerbation for a month. She is unable to walk independently without walker. She saw podiatrist only minor improvement with steroid injection, not on oral gout therapy.  She was asked to hold her entresto and bumex given LANE on labs. Her nephro is Dr Mcdaniels.   Interim her gout was successfully treated by podiatrist. She is able to walk and her pain has resolved. She is hydrating. Followup labs 3/14/25 K 3.8, cr 1.65 She is back on entresto and taking bumex 1mg M-W-F as directed by HF team. She had increased weight yesterday with CALI. PAD was 17, took extra bumex, and today PAD 13, sx/weight improved.   EKG at last visit was Vpaced with underlying AFL. She saw EP as appears she went back into persistent AF on 2/15 with controlled rates (coincides with gout attack), and there is discussion of AF ablation. She is asx for AF.  There continues to be ~56% burden of PAF with rates in 70s when in AF.  Her echo shows LVEF 40-45% unchanged, reduced RV function, bioprosthetic MV in position with mild MR, mild AS low flow low gradient, PASP 25mmHg.  There is plan for AF ablation 7/24/25  Lab 5/23/25 hg 14.8,  k 4.8, cr 1.25, AST /ALT 43/39, trig 197, LDL 97, a1c 6.3, BNP 1173   labs 3/14/25 K 3.8, cr 1.65, BNP 1701 Lab 3/7/25 creat 3 !, K 3.1, AST 46, ALT 40, trigs 235,  Lab 2/11/25 ALT 58, AST 44 2/3/25 k 4.7, cr 1.28, a1c 9.3, Ca 9.7, glucose 152, microal/creat ratio 233mg/g Lab 12/2024 , ,  Lab 10/31/24 , , k 4.6, cr 1.4, elevated Ca level pt reports known by nephro elevated glucose 346

## 2025-06-17 NOTE — HISTORY OF PRESENT ILLNESS
[None] : The patient complains of no symptoms [de-identified] : CardioMEMs Summary See scanned in documents for updated data Reviewed between 5/8/25-6/17/25 DOI:10/12/22  AVG PAD 10-15: mmHg Goal 14 mmHg SUMMARY: During the period indicated above, the patient's pulmonary artery pressures were monitored at least weekly and based on the pressures, the pts medications were adjusted. Medication changes were communicated to the patient.   The daily data reports are in the Cardio MEMS flowsheet and are also archived in the [email or web address] [facility] system.

## 2025-06-17 NOTE — HISTORY OF PRESENT ILLNESS
[None] : The patient complains of no symptoms [de-identified] : CardioMEMs Summary See scanned in documents for updated data Reviewed between 5/8/25-6/17/25 DOI:10/12/22  AVG PAD 10-15: mmHg Goal 14 mmHg SUMMARY: During the period indicated above, the patient's pulmonary artery pressures were monitored at least weekly and based on the pressures, the pts medications were adjusted. Medication changes were communicated to the patient.   The daily data reports are in the Cardio MEMS flowsheet and are also archived in the [email or web address] [facility] system.

## 2025-07-22 NOTE — HISTORY OF PRESENT ILLNESS
[None] : The patient complains of no symptoms [de-identified] : CardioMEMs Summary  Reviewed 6/17/2025-7/21/2025 Documentation of readings are scanned into EMR, they are reviewed daily DOI:10/12/22 Goal 14 mmHg SUMMARY: During the period indicated above, the patient's pulmonary artery pressures were monitored at least weekly and based on the pressures, the pts medications were adjusted. Medication changes were communicated to the patient.   The daily data reports are in the Cardio MEMS flowsheet and are also archived in the [email or web address] [facility] system.